# Patient Record
Sex: MALE | Race: WHITE | NOT HISPANIC OR LATINO | Employment: FULL TIME | URBAN - METROPOLITAN AREA
[De-identification: names, ages, dates, MRNs, and addresses within clinical notes are randomized per-mention and may not be internally consistent; named-entity substitution may affect disease eponyms.]

---

## 2023-06-19 ENCOUNTER — HOSPITAL ENCOUNTER (EMERGENCY)
Facility: MEDICAL CENTER | Age: 28
End: 2023-06-19
Attending: EMERGENCY MEDICINE
Payer: MEDICAID

## 2023-06-19 VITALS
OXYGEN SATURATION: 98 % | SYSTOLIC BLOOD PRESSURE: 140 MMHG | TEMPERATURE: 98.6 F | WEIGHT: 142.2 LBS | BODY MASS INDEX: 19.91 KG/M2 | RESPIRATION RATE: 18 BRPM | DIASTOLIC BLOOD PRESSURE: 85 MMHG | HEIGHT: 71 IN | HEART RATE: 89 BPM

## 2023-06-19 DIAGNOSIS — J01.00 ACUTE NON-RECURRENT MAXILLARY SINUSITIS: ICD-10-CM

## 2023-06-19 LAB
FLUAV RNA SPEC QL NAA+PROBE: NEGATIVE
FLUBV RNA SPEC QL NAA+PROBE: NEGATIVE
RSV RNA SPEC QL NAA+PROBE: NEGATIVE
SARS-COV-2 RNA RESP QL NAA+PROBE: NOTDETECTED
SPECIMEN SOURCE: NORMAL

## 2023-06-19 PROCEDURE — 0241U HCHG SARS-COV-2 COVID-19 NFCT DS RESP RNA 4 TRGT MIC: CPT

## 2023-06-19 PROCEDURE — C9803 HOPD COVID-19 SPEC COLLECT: HCPCS | Performed by: EMERGENCY MEDICINE

## 2023-06-19 PROCEDURE — 99283 EMERGENCY DEPT VISIT LOW MDM: CPT

## 2023-06-19 RX ORDER — AMOXICILLIN AND CLAVULANATE POTASSIUM 875; 125 MG/1; MG/1
1 TABLET, FILM COATED ORAL 2 TIMES DAILY
Qty: 20 TABLET | Refills: 0 | Status: SHIPPED | OUTPATIENT
Start: 2023-06-19 | End: 2023-06-29

## 2023-06-19 NOTE — ED TRIAGE NOTES
Pt comes in w/ girlfriend   pt c/o being ill for the last couple of weeks   cough, congestion, ear pain bilateral, and sinus pain    not getting better per pt

## 2023-06-19 NOTE — ED PROVIDER NOTES
"ED Provider Note    CHIEF COMPLAINT  Chief Complaint   Patient presents with    Sinus Pain     Started about a week ago      Cough     Cough started about a week ago      Sore Throat     Started about the same time        Nasal Congestion     Nasal congestion     Ear Pain     Both ears hurt  started about a week ago        EXTERNAL RECORDS REVIEWED  None available    HPI/ROS  LIMITATION TO HISTORY   None  OUTSIDE HISTORIAN(S):  Girlfriend provided additional history    Shadi Barrow is a 28 y.o. male who presents evaluation of 10 days of ongoing sore throat low-grade fever facial pain congestion.  The patient is an otherwise healthy 28-year-old with no significant medical or surgical history.  He denies any report of productive cough rash or neck stiffness.  He has not sought any testing.  He denies any muffled voice or trouble swallowing.  No productive cough.  No associated night sweats or weight loss.    PAST MEDICAL HISTORY   No significant medical history    SURGICAL HISTORY   has a past surgical history that includes other.  None  FAMILY HISTORY  History reviewed. No pertinent family history.  None  SOCIAL HISTORY  Social History     Tobacco Use    Smoking status: Every Day     Types: Cigarettes    Smokeless tobacco: Never   Vaping Use    Vaping Use: Never used   Substance and Sexual Activity    Alcohol use: Yes     Comment: occ    Drug use: Yes     Types: Inhaled     Comment: grazyna    Sexual activity: Not on file     No IV drug use  CURRENT MEDICATIONS  No current facility-administered medications for this encounter.  No current outpatient medications on file.  Adderall      ALLERGIES  Allergies   Allergen Reactions    Klonopin [Clonazepam] Unspecified     \"Freak out\"    Strattera [Atomoxetine] Unspecified     \"Freak out\"        PHYSICAL EXAM  VITAL SIGNS: BP (!) 140/85   Pulse 89   Temp 37 °C (98.6 °F) (Temporal)   Resp 18   Ht 1.803 m (5' 11\")   Wt 64.5 kg (142 lb 3.2 oz)   SpO2 98%   BMI 19.83 " kg/m²    Pulse ox interpretation: I interpret this pulse ox as normal.  Constitutional: Alert and oriented x 3, no acute distress  HEENT: Atraumatic normocephalic, pupils are equal round reactive to light extraocular movements are intact. The nares is clear, external ears are normal, mouth shows moist mucous membranes normal dentition for age posterior pharynx has postnasal drip no exudates or abscess uvula is midline bilateral tympanic membranes are clear.  Focal tenderness noted over the bifrontal sinuses as well as maxillary sinuses no bogginess  Neck: Supple, no JVD no tracheal deviation  Cardiovascular: Regular rate and rhythm no murmur rub or gallop 2+ pulses peripherally x4  Thorax & Lungs: No respiratory distress, no wheezes rales or rhonchi, No chest tenderness.   GI: Soft nontender nondistended positive bowel sounds, no peritoneal signs  Skin: Warm dry no acute rash or lesion  Musculoskeletal: Moving all extremities with full range and 5 of 5 strength no acute  deformity  Neurologic: Cranial nerves III through XII are grossly intact no sensory deficit no cerebellar dysfunction   Psychiatric: Appropriate affect for situation at this time          DIAGNOSTIC STUDIES / PROCEDURES      LABS  Viral testing is pending    RADIOLOGY  None clinically indicated  COURSE & MEDICAL DECISION MAKING    ED Observation Status? No; Patient does not meet criteria for ED Observation.     INITIAL ASSESSMENT, COURSE AND PLAN  Care Narrative:     This is a very pleasant 28-year-old gentleman who presents here with 10 days of symptoms consistent with URI and likely development of sinusitis.  Based on the acuity and length of the symptoms he does qualify for antibiotic therapy.  I will place him on Augmentin for 10 days as well as recommend over-the-counter nasal steroids and decongestants.  He did request viral testing which I did not think would  at this time.  This will be performed and he can follow-up on an  MyChart.  Return precautions have been reviewed      ADDITIONAL PROBLEM LIST    DISPOSITION AND DISCUSSIONS  I have discussed management of the patient with the following physicians and GABRIELLE's: None    Discussion of management with other QHP or appropriate source(s): None    Escalation of care considered, and ultimately not performed:blood analysis and diagnostic imaging    Barriers to care at this time, including but not limited to: None    Decision tools and prescription drugs considered including, but not limited to: None    FINAL DIAGNOSIS  Acute sinusitis       Electronically signed by: Myke Boss M.D., 6/19/2023 10:11 AM

## 2023-06-19 NOTE — ED NOTES
Reviewed discharge instructions and printed prescription stapled to discharge paperwork w/ pt, verbalized understanding to information provided including follow up care w/ PCP, return precautions and medication.  Advised to check MyChart for Covid test results.  Pt ambulated from ED w/ visitor.

## 2023-09-11 ENCOUNTER — HOSPITAL ENCOUNTER (EMERGENCY)
Facility: MEDICAL CENTER | Age: 28
End: 2023-09-11
Attending: EMERGENCY MEDICINE
Payer: COMMERCIAL

## 2023-09-11 VITALS
BODY MASS INDEX: 23.46 KG/M2 | OXYGEN SATURATION: 98 % | WEIGHT: 167.55 LBS | RESPIRATION RATE: 16 BRPM | SYSTOLIC BLOOD PRESSURE: 136 MMHG | DIASTOLIC BLOOD PRESSURE: 89 MMHG | HEIGHT: 71 IN | TEMPERATURE: 98 F | HEART RATE: 86 BPM

## 2023-09-11 DIAGNOSIS — H20.9 IRITIS: ICD-10-CM

## 2023-09-11 DIAGNOSIS — H18.891 CORNEAL RUST RING OF RIGHT EYE: ICD-10-CM

## 2023-09-11 PROCEDURE — 99284 EMERGENCY DEPT VISIT MOD MDM: CPT

## 2023-09-11 PROCEDURE — 700101 HCHG RX REV CODE 250: Performed by: EMERGENCY MEDICINE

## 2023-09-11 RX ORDER — PROPARACAINE HYDROCHLORIDE 5 MG/ML
1 SOLUTION/ DROPS OPHTHALMIC ONCE
Status: COMPLETED | OUTPATIENT
Start: 2023-09-11 | End: 2023-09-11

## 2023-09-11 RX ADMIN — FLUORESCEIN SODIUM 1 MG: 1 STRIP OPHTHALMIC at 08:40

## 2023-09-11 RX ADMIN — PROPARACAINE HYDROCHLORIDE 1 DROP: 5 SOLUTION/ DROPS OPHTHALMIC at 08:40

## 2023-09-11 NOTE — ED TRIAGE NOTES
"Chief Complaint   Patient presents with    Eye Pain     Patient reports he had a foreign body in his R eye last Wednesday and was seen at [facility] where he was given antibiotics. Patient reports the pain has not improved.       27 yo male to triage for above complaint.   Redness noted to eye. Patient reports associated blurred vision and pain.    Pt is alert and oriented, speaking in full sentences, follows commands and responds appropriately to questions.     Patient placed back in lobby and educated on triage process. Asked to inform RN of any changes.    BP (!) 134/107   Pulse 94   Temp 36.7 °C (98 °F) (Temporal)   Resp 16   Ht 1.803 m (5' 11\")   Wt 76 kg (167 lb 8.8 oz)   SpO2 99%   BMI 23.37 kg/m²     "

## 2023-09-11 NOTE — LETTER
"  FORM C-4:  EMPLOYEE’S CLAIM FOR COMPENSATION/ REPORT OF INITIAL TREATMENT  EMPLOYEE’S CLAIM - PROVIDE ALL INFORMATION REQUESTED   First Name Shadi Last Name Danial Birthdate 1995  Sex male Claim Number   Home Address [address]   [place]             Zip [address]                                   Age  28 y.o. Height  1.803 m (5' 11\") Weight  76 kg (167 lb 8.8 oz) [facility]     Mailing Address [address]  [place]              Zip [address] Telephone  [phone] (home)  Primary Language Spoken  English   Insurer   Third Party    Employee's Occupation (Job Title) When Injury or Occupational Disease Occurred  [employer]   Employer's Name [employer] Telephone [phone]    Employer Address [address] [[address]] Zip [address]   Date of Injury  9/6/2023       Hour of Injury  12:00 PM Date Employer Notified  9/7/2023 Last Day of Work after Injury or Occupational Disease  9/6/2023 Supervisor to Whom Injury Reported  [employer]   Address or Location of Accident (if applicable) Work [1]   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 Be specific and answer in detail. Use additional sheet if necessary)  I was cutting a sheet of metal when a piece of metal bounced underneith my safety glasses and hit me in the right eye   If you believe that you have an occupational disease, when did you first have knowledge of the disability and it relationship to your employment? n/a Witnesses to the Accident  n/a   Nature of Injury or Occupational Disease  Workers' Compensation Part(s) of Body Injured or Affected  Eye (R), N/A, N/A    I CERTIFY THAT THE ABOVE IS TRUE AND CORRECT TO THE BEST OF MY KNOWLEDGE AND THAT I HAVE PROVIDED THIS INFORMATION IN ORDER TO OBTAIN THE BENEFITS OF [place] " [place]  I HEREBY AUTHORIZE ANY PHYSICIAN, CHIROPRACTOR, SURGEON, PRACTITIONER, OR OTHER PERSON, ANY HOSPITAL, INCLUDING [facility] OR [facility], ANY MEDICAL SERVICE ORGANIZATION, ANY INSURANCE COMPANY, OR OTHER INSTITUTION OR ORGANIZATION TO RELEASE TO EACH OTHER, ANY MEDICAL OR OTHER INFORMATION, INCLUDING BENEFITS PAID OR PAYABLE, PERTINENT TO THIS INJURY OR DISEASE, EXCEPT INFORMATION RELATIVE TO DIAGNOSIS, TREATMENT AND/OR COUNSELING FOR AIDS, PSYCHOLOGICAL CONDITIONS, ALCOHOL OR CONTROLLED SUBSTANCES, FOR WHICH I MUST GIVE SPECIFIC AUTHORIZATION.  A PHOTOSTAT OF THIS AUTHORIZATION SHALL BE AS VALID AS THE ORIGINAL.  Date   9/11/23  Place  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   Hour  9:25 AM Description of Injury or Disease  Corneal rust ring of right eye  Iritis No  Comments:no   Treatment  Visual acuities, slit lamp exam, ophtho consult. Dr. Ledbetter, ophtho, wants to see patient right now in her office  Have you advised the patient to remain off work five days or more?         No   X-Ray Findings    Comments:n/a If Yes   From Date    To Date      From information given by the employee, together with medical evidence, can you directly connect this injury or occupational disease as job incurred? Yes  Comments:yes If No, is employee capable of: Full Duty  No Modified Duty  Yes   Is additional medical care by a physician indicated? Yes  Comments:ophtho and work comp If Modified Duty, Specify any Limitations / Restrictions   Must wear protective  "glasses and sunglasses at all times. Any other limitations per ophtho and work comp   Do you know of any previous injury or disease contributing to this condition or occupational disease? No  Comments:no    Date 9/11/2023 Print Doctor’s Name Gabo Talamantes certify the employer’s copy of this form was mailed on:   Address [address] [place] [id number] [phone] INSURER’S USE ONLY   Provider’s Tax ID Number [id number] Telephone Dept: [phone]    Doctor’s Signature e-GABO Wu M.D. Degree M.D.      Form C-4 (rev.10/07)                                                                         BRIEF DESCRIPTION OF RIGHTS AND BENEFITS  ([place]ursuant to [place] 616[place])    Notice of Injury or Occupational Disease (Incident Report Form C-1): If an injury or occupational disease (OD) arises out of and in the course of employment, you must provide written notice to your employer as soon as practicable, but no later than 7 days after the accident or OD. Your employer shall maintain a sufficient supply of the required forms.    Claim for Compensation (Form C-4): If medical treatment is sought, the form C-4 is available at the place of initial treatment. A completed \"Claim for Compensation\" (Form C-4) must be filed within 90 days after an accident or OD. The treating physician or chiropractor must, within 3 working days after treatment, complete and mail to the employer, the employer's insurer and third-party , the Claim for Compensation.    Medical Treatment: If you require medical treatment for your on-the-job injury or OD, you may be required to select a physician or chiropractor from a list provided by your workers’ compensation insurer, if it has contracted with an Organization for Managed Care (MCO) or Preferred Provider Organization (PPO) or providers of health care. If your employer has not entered into a contract with an MCO or PPO, you may select a physician or chiropractor from the Panel of " Physicians and Chiropractors. Any medical costs related to your industrial injury or OD will be paid by your insurer.    Temporary Total Disability (TTD): If your doctor has certified that you are unable to work for a period of at least 5 consecutive days, or 5 cumulative days in a 20-day period, or places restrictions on you that your employer does not accommodate, you may be entitled to TTD compensation.    Temporary Partial Disability (TPD): If the wage you receive upon reemployment is less than the compensation for TTD to which you are entitled, the insurer may be required to pay you TPD compensation to make up the difference. TPD can only be paid for a maximum of 24 months.    Permanent Partial Disability (PPD): When your medical condition is stable and there is an indication of a PPD as a result of your injury or OD, within 30 days, your insurer must arrange for an evaluation by a rating physician or chiropractor to determine the degree of your PPD. The amount of your PPD award depends on the date of injury, the results of the PPD evaluation, your age and wage.    Permanent Total Disability (PTD): If you are medically certified by a treating physician or chiropractor as permanently and totally disabled and have been granted a PTD status by your insurer, you are entitled to receive monthly benefits not to exceed 66 2/3% of your average monthly wage. The amount of your PTD payments is subject to reduction if you previously received a lump-sum PPD award.    Vocational Rehabilitation Services: You may be eligible for vocational rehabilitation services if you are unable to return to the job due to a permanent physical impairment or permanent restrictions as a result of your injury or occupational disease.    Transportation and Per Parminder Reimbursement: You may be eligible for travel expenses and per parminder associated with medical treatment.    Reopening: You may be able to reopen your claim if your condition worsens  after claim closure.     Appeal Process: If you disagree with a written determination issued by the insurer or the insurer does not respond to your request, you may appeal to the Department of Administration, , by following the instructions contained in your determination letter. You must appeal the determination within 70 days from the date of the determination letter at [address], [address], [place], [place] [address], or [address], [address], [place], [place] [address]. If you disagree with the  decision, you may appeal to the Department of Administration, . You must file your appeal within 30 days from the date of the  decision letter at [address], [address], [place], [place] [address], or [address], [place], [place] [address]. If you disagree with a decision of an , you may file a petition for judicial review with the District Court. You must do so within 30 days of the Appeal Officer’s decision. You may be represented by an  at your own expense or you may contact the [facility] for possible representation.    [place]  for Injured Workers (NAIW): If you disagree with a  decision, you may request that NAIW represent you without charge at an  Hearing. For information regarding denial of benefits, you may contact the NA at: [address], [address], [place] [address], ([phone], or [address], [address], [place] [address], [phone]    To File a Complaint with the Division: If you wish to file a complaint with the  of the Division of Industrial Relations (DIR),  please contact the Workers’ Compensation Section, [address] [place] [address], [place], [place] [address], telephone (808) [phone], or [address], [place], [place] [address], telephone ([phone]) [phone].    For assistance  with Workers’ Compensation Issues: You may contact the [facility] Office for Consumer Health Assistance, [facility] [facility], [address], [place] [address], Toll Free 1-264.246.8005, Web site: http://[facility].[place].gov/Programs/KATI E-mail: kati@[facility].nv.gov  D-2 (rev. 10/20)              __________________________________________________________________                                    9/11/23            Employee Name / Signature                                                                                                                            Date

## 2023-09-11 NOTE — ED PROVIDER NOTES
ER Provider Note    Scribed for Connie Talamantes M.d. by Jaspreet Grove. 9/11/2023  8:05 AM    Primary Care Provider: Pcp Pt States None    CHIEF COMPLAINT  Chief Complaint   Patient presents with    Eye Pain     Patient reports he had a foreign body in his R eye last Wednesday and was seen at [facility] where he was given antibiotics. Patient reports the pain has not improved.     EXTERNAL RECORDS REVIEWED  Outpatient Notes shows that the patient was seen at [facility] in June 2023 for URI symptoms and diagnosed with sinusitis.     HPI/ROS  LIMITATION TO HISTORY   Select: : None  OUTSIDE HISTORIAN(S):  None    Shadi Barrow is a 28 y.o. male who presents to the ED complaining of acute right eye pain onset five days ago. Patient reports that as he was at work grinding metal, a small jamey of metal went underneath his safety goggles and went into his right eye. He does not wear contacts or glasses. He notes that he did not experience any symptoms immediately after the accident, but began noticing pain to his right eye the next day. The next day, the patient was seen at [facility] for his symptoms. His right eye was evaluated at time, and no foreign body was able to be seen. No imaging was done at that time. He was prescribed antibiotics, and was discharged later that day. He notes compliancy with the antibiotics, however, reports that his symptoms are worsening, prompting him to present to the ED today for further evaluation. Patient has associated burry vision, photophobia, and air sensitivity to the right eye. Pain is exacerbated with looking down. Tetanus is up to date.     PAST MEDICAL HISTORY  History reviewed. No pertinent past medical history.    SURGICAL HISTORY  Past Surgical History:   Procedure Laterality Date    OTHER      T&A when pt was 5 yrs old    tubes placed in ears multiple time  still has them   cyst L middle finger 5/2022       FAMILY HISTORY  No family  "history noted.     SOCIAL HISTORY   reports that he has been smoking cigarettes. He has never used smokeless tobacco. He reports current alcohol use. He reports current drug use. Drug: Inhaled.    CURRENT MEDICATIONS  None noted    ALLERGIES  Klonopin [clonazepam] and Strattera [atomoxetine]    PHYSICAL EXAM  BP (!) 134/107   Pulse 94   Temp 36.7 °C (98 °F) (Temporal)   Resp 16   Ht 1.803 m (5' 11\")   Wt 76 kg (167 lb 8.8 oz)   SpO2 99%   BMI 23.37 kg/m²     Constitutional: Alert in no apparent distress.  HENT: Normocephalic, Bilateral external ears normal. Nose normal.   Eyes: Pupils are equal, round, and reactive to light. Lids and lashes without mattering. There is significant conjunctival injection. Anterior chamber is normal in depth. Consenual photophobia. EOMI. No Tylor's sign. No dendritic lesions. No teardrop pupil. Visual acuity is R 20/25; L 20/25; Both 20/20  Thorax & Lungs: Easy unlabored respirations  Skin: Visualized skin is  Dry, No erythema, No rash.   Neurologic: Alert, Clear speech  Psychiatric: Affect and Mood normal      PROCEDURES  Slit Lamp Procedure    Indication: Suspected foreign body                   Procedure: The patient's head was positioned appropriately to provide adequate exposure of the right eye. Fluorescein staining was performed in the right eye which revealed a rust ring which looks like it might be slightly ulcerated at the 9 o'clock position between the limbus and the pupil. There was no other uptake on the cornea. Pupils are equal, round, and reactive to light. There is significant conjunctival injection. Anterior chamber is normal in depth. Conceptual photophobia. EOMI. No Tylor's sign. No dendritic lesions. No teardrop lesions.     The patient tolerated the procedure well.    Complications: None      COURSE & MEDICAL DECISION MAKING     ED Observation Status? Yes; I am placing the patient in to an observation status due to a diagnostic uncertainty as well as " therapeutic intensity. Patient placed in observation status at 8:05 AM, 9/11/2023.     Observation plan is as follows: We will manage their symptoms, evaluate with diagnostic testing, and then reassess after results are reviewed      Upon Reevaluation, the patient's condition has: Improved; and will be discharged.    Patient discharged from ED Observation status at 9:20 AM (Time) 9/11/2023 (Date).     INITIAL ASSESSMENT, COURSE AND PLAN  Care Narrative: Patient presents to the ER complaining of persistent right eye pain and redness over the last 5 days since a small piece of metal bounced off of his cheek and then up under his safety glasses and into his eye 5 days ago when he was cutting a metal roof.  He denies any large projectile going into his eye.  He complains of some blurry vision but the visual acuity in his right eye is 20/25 without correction.  He does not normally wear contacts or glasses.  He said he went to [facility] 4 days ago when he was given prescription for erythromycin ophthalmic ointment.  He was told he had an abrasion on his cornea.  He says despite the antibiotics, his redness and pain is gotten worse.  He is very light sensitive.  Patient has consensual photophobia here in the ER.  He has direct photophobia as well.  His conjunctive is quite injected.  He has a small rust ring at the 9 o'clock position between the limbus and the pupil.  There looks to be a little bit of ulceration around the rust ring.  I do not see any obvious foreign objects in the eye and the lids were swept for foreign objects without any identifiable foreign objects on the cotton tip applicator with sweeping.  I am concerned the patient has developed an iritis from his metal foreign body/rust ring.  I spoke with Dr. Ledbetter, ophthalmologist on-call.  She will see the patient in her office right now.  The C4 form has been filled out by myself.  The patient was told to proceed directly to Dr. Ledbetter's office  at [address] on the 6 floor.  Patient agrees to do so right now.  At this time further evaluation management done by the ophthalmologist.    8:05 AM - Patient was seen and evaluated at bedside. Patient presents to the ED for right eye pain. After my exam, I discussed with the patient the plan of care, which includes doing an eye exam and consulting with Ophthalmology. Patient understands and verbalizes agreement to plan of care. Visual Acuity: R 20/25; L 20/25; Both 20/20    9:00 AM - Patient was reevaluated at bedside. I did an eye exam on the patient. See above procedure note and physical exam for findings. After, I updated him on the plan of care, which includes consulting with Ophthalmology.     9:14 AM - Paged Ophthalmology     9:17 AM  - I discussed the patient's case and the above findings with Dr. Ledbetter (Opthalmology) who will see the patient in office right now.      9:20 AM - Patient was reevaluated at bedside. I discussed with him my consultation with Ophthalmology, and the plan to follow up with her now. Patient understands and verbalizes agreement to plan of care. I then informed the patient of my plan for discharge, which includes strict return precautions for any new or worsening symptoms. Patient understands and verbalizes agreement to plan of care. Patient is comfortable going home at this time.      HTN/IDDM FOLLOW UP:  The patient is referred to a primary physician for blood pressure management, diabetic screening, and for all other preventive health concerns    ADDITIONAL PROBLEM LIST  Problem #1: Right eye pain and redness    DISPOSITION AND DISCUSSIONS  I have discussed management of the patient with the following physicians and GABRIELLE's:  Dr. Ledbetter (Ophthalmology)    Discussion of management with other [facility] or appropriate source(s): None     Escalation of care considered, and ultimately not performed: diagnostic imaging.  Patient reports it was a small jamey of metal that bounced off of his cheek and  then went up and under his safety goggles and got into his eye.  No large projectile going into his eye and therefore no need for CT scan of the orbits as I have low suspicion for globe rupture.    Barriers to care at this time, including but not limited to: Patient does not have established PCP.     Decision tools and prescription drugs considered including, but not limited to: Antibiotics patient is going straight to the ophthalmologist office.  Will defer antibiotics to the ophthalmologist. .    Patient will be discharged home.    FOLLOW UP:  Jono Ledbetter M.D.  [address]  [facility] [address]  [facility] [address]  [phone]    Go on 9/11/2023  proceed to Dr. Ledbetter's office right now    FINAL DIAGNOSIS  1. Corneal rust ring of right eye    2. Iritis    3.     Slight Lamp Exam     This dictation has been created using voice recognition software. The accuracy of the dictation is limited by the abilities of the software. I expect there may be some errors of grammar and possibly content. I made every attempt to manually correct the errors within my dictation. However, errors related to voice recognition software may still exist and should be interpreted within the appropriate context.     Jaspreet CLEARY (Javieribe), am scribing for, and in the presence of, Connie Talamantes M.D..    Electronically signed by: Jaspreet Grove (Lauren), 9/11/2023    Connie CLEARY M.D. personally performed the services described in this documentation, as scribed by Jaspreet Grove in my presence, and it is both accurate and complete.      The note accurately reflects work and decisions made by me.  Connie Talamantes M.D.  9/11/2023  5:14 PM